# Patient Record
Sex: MALE | Race: WHITE | HISPANIC OR LATINO | Employment: UNEMPLOYED
[De-identification: names, ages, dates, MRNs, and addresses within clinical notes are randomized per-mention and may not be internally consistent; named-entity substitution may affect disease eponyms.]

---
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
## 2024-06-10 ENCOUNTER — OFFICE VISIT (OUTPATIENT)
Dept: INTERNAL MEDICINE CLINIC | Facility: CLINIC | Age: 19
End: 2024-06-10

## 2024-06-10 VITALS
HEART RATE: 56 BPM | DIASTOLIC BLOOD PRESSURE: 74 MMHG | OXYGEN SATURATION: 98 % | SYSTOLIC BLOOD PRESSURE: 120 MMHG | BODY MASS INDEX: 24.35 KG/M2 | HEIGHT: 67.5 IN | WEIGHT: 157 LBS

## 2024-06-10 DIAGNOSIS — Z00.00 ANNUAL PHYSICAL EXAM: Primary | ICD-10-CM

## 2024-06-10 DIAGNOSIS — E55.9 VITAMIN D DEFICIENCY: ICD-10-CM

## 2024-06-10 DIAGNOSIS — Z00.00 PHYSICAL EXAM: ICD-10-CM

## 2024-06-10 PROCEDURE — 99395 PREV VISIT EST AGE 18-39: CPT | Performed by: NURSE PRACTITIONER

## 2024-06-10 NOTE — PROGRESS NOTES
HPI:    Patient ID: Serafin Dale is a 18 year old male.  Allergic to Dogs    HPI Physical Exam  18 year old male who I met before. He denies any depression.  He does work out, play music.  He has not found a job but is working on it.  He smokes [place] daily.        Exercise- Weights    Vitamin D deficiency in the past    Immunization History   Administered Date(s) Administered    DTAP INFANRIX 08/13/2004, 10/15/2004, 12/16/2004, 09/23/2005, 07/02/2008    HEP A,Ped/Adol,(2 Dose) 07/18/2008, 06/03/2009    HPV (Gardasil) 09/08/2015    Hib, Unspecified Formulation 08/13/2004, 10/15/2004, 06/17/2005    Hpv Virus Vaccine 9 Dionne Im 05/02/2018    IPV 08/13/2004, 10/15/2004, 12/16/2004, 07/02/2008    MMR 06/17/2005, 09/23/2005, 07/02/2008    Meningococcal-Menveo 2month-55yr 09/08/2015, 08/19/2021    Pneumococcal (Prevnar 7) 02/18/2005, 03/18/2005, 04/22/2005, 05/13/2005    TDAP 09/08/2015    Varicella 06/17/2005, 07/02/2008   Pended Date(s) Pended    FLULAVAL 6 months & older 0.5 ml Prefilled syringe ([id number]) 11/10/2022       History reviewed. No pertinent past medical history.   History reviewed. No pertinent surgical history.   Social History     Socioeconomic History    Marital status: Single   Tobacco Use    Smoking status: Never    Smokeless tobacco: Never   Vaping Use    Vaping status: Never Used   Substance and Sexual Activity    Alcohol use: Not Currently    Drug use: Not Currently          Review of Systems   Constitutional:  Negative for chills, fatigue and fever.   HENT:  Negative for ear pain, hearing loss, sinus pain, sore throat, trouble swallowing and voice change.    Eyes:  Negative for pain and visual disturbance.   Respiratory:  Negative for cough, chest tightness and shortness of breath.    Cardiovascular:  Negative for chest pain, palpitations and leg swelling.   Gastrointestinal:  Negative for abdominal pain, constipation, diarrhea, nausea and vomiting.   Endocrine: Negative for cold intolerance and  heat intolerance.   Genitourinary:  Negative for dysuria and hematuria.   Musculoskeletal:  Positive for myalgias. Negative for back pain and joint swelling.   Skin:  Negative for rash.   Allergic/Immunologic: Negative for environmental allergies.   Neurological:  Negative for weakness, numbness and headaches.   Hematological:  Does not bruise/bleed easily.   Psychiatric/Behavioral:  Negative for dysphoric mood and sleep disturbance. The patient is nervous/anxious.               Current Outpatient Medications   Medication Sig Dispense Refill    cetirizine 10 MG Oral Tab Take 1 tablet (10 mg total) by mouth daily.       Allergies:  Allergies   Allergen Reactions    Penicillin G Benzathine HIVES      PHYSICAL EXAM:   Physical Exam  Constitutional:       Appearance: Normal appearance. He is well-developed.   HENT:      Head: Normocephalic.      Right Ear: Tympanic membrane normal.      Left Ear: Tympanic membrane normal.      Nose: Nose normal.      Mouth/Throat:      Mouth: Mucous membranes are moist.      Pharynx: No oropharyngeal exudate or posterior oropharyngeal erythema.   Eyes:      General:         Right eye: No discharge.         Left eye: No discharge.      Pupils: Pupils are equal, round, and reactive to light.   Cardiovascular:      Rate and Rhythm: Normal rate and regular rhythm.      Heart sounds: Normal heart sounds. No murmur heard.     No friction rub. No gallop.   Pulmonary:      Effort: Pulmonary effort is normal. No respiratory distress.      Breath sounds: Normal breath sounds. No wheezing, rhonchi or rales.   Abdominal:      General: Bowel sounds are normal. There is no distension.      Palpations: Abdomen is soft. There is no mass.      Tenderness: There is no abdominal tenderness. There is no right CVA tenderness, left CVA tenderness or guarding.   Musculoskeletal:         General: No tenderness.      Cervical back: Normal range of motion and neck supple. No tenderness.      Right lower leg: No  edema.      Left lower leg: No edema.   Lymphadenopathy:      Cervical: No cervical adenopathy.   Skin:     General: Skin is warm and dry.      Findings: No rash.   Neurological:      Mental Status: He is alert and oriented to person, place, and time.      Coordination: Coordination normal.      Gait: Gait normal.   Psychiatric:         Mood and Affect: Mood normal.         Behavior: Behavior normal.         Thought Content: Thought content normal.         Judgment: Judgment normal.       /74   Pulse 56   Ht 5' 7.5\" (1.715 m)   Wt 157 lb (71.2 kg)   SpO2 98%   BMI 24.23 kg/m²   Wt Readings from Last 2 Encounters:   06/10/24 157 lb (71.2 kg) (52%, Z= 0.05)*   11/10/22 156 lb (70.8 kg) (59%, Z= 0.24)*     * Growth percentiles are based on [facility] (Boys, 2-20 Years) data.     Body mass index is 24.23 kg/m².(2)  Lab Results   Component Value Date    WBC 7.3 07/28/2022    RBC 5.54 07/28/2022    HGB 15.5 07/28/2022    HCT 47.4 07/28/2022    MCV 85.6 07/28/2022    MCH 28.0 07/28/2022    MCHC 32.7 07/28/2022    RDW 12.5 07/28/2022    .0 07/28/2022      Lab Results   Component Value Date     (H) 07/28/2022    BUN 15 07/28/2022    BUNCREA 16.1 07/28/2022    CREATSERUM 0.93 07/28/2022    ANIONGAP 5 07/28/2022    GFRNAA 119 07/28/2022    GFRAA 138 07/28/2022    CA 9.5 07/28/2022    OSMOCALC 287 07/28/2022    ALKPHO 83 07/28/2022    AST 21 07/28/2022    ALT 20 07/28/2022    BILT 1.1 07/28/2022    TP 7.8 07/28/2022    ALB 4.3 07/28/2022    GLOBULIN 3.5 07/28/2022     07/28/2022    K 4.1 07/28/2022     07/28/2022    CO2 29.0 07/28/2022      No results found for: \"EAG\", \"A1C\"   No results found for: \"CHOLEST\", \"TRIG\", \"HDL\", \"LDL\", \"VLDL\", \"TCHDLRATIO\", \"NONHDLC\", \"CHOLHDLRATIO\", \"CALCNONHDL\"   No results found for: \"T4F\", \"TSH\", \"TSHT4\"             ASSESSMENT/PLAN:     Problem List Items Addressed This Visit       Vitamin D deficiency     Vitamin D deficiency in the past    Plan   Check Vitamin D  level         Physical exam     Normal exam.  Labs as ordered.  Skin check normal.  Hernial orifices intact.  No cervical, inguinal, axillary lymphadenopathy.  Discussed self testicular exam  Immunizations- Up to date             I            Other Visit Diagnoses       Annual physical exam    -  Primary    Relevant Orders    CBC W Differential W Platelet [E]    Comp Metabolic Panel (14)    Vitamin D               Orders Placed This Encounter   Procedures    CBC W Differential W Platelet [E]    Comp Metabolic Panel (14)    Vitamin D       Meds This Visit:  Requested Prescriptions      No prescriptions requested or ordered in this encounter       Imaging & Referrals:  None         JAMIE Dove

## 2024-06-11 NOTE — ASSESSMENT & PLAN NOTE
Normal exam.  Labs as ordered.  Skin check normal.  Hernial orifices intact.  No cervical, inguinal, axillary lymphadenopathy.  Discussed self testicular exam  Immunizations- Up to date             I

## 2024-11-29 ENCOUNTER — HOSPITAL ENCOUNTER (EMERGENCY)
Facility: HOSPITAL | Age: 19
Discharge: HOME | End: 2024-11-29
Attending: STUDENT IN AN ORGANIZED HEALTH CARE EDUCATION/TRAINING PROGRAM
Payer: MEDICAID

## 2024-11-29 VITALS
TEMPERATURE: 99 F | SYSTOLIC BLOOD PRESSURE: 131 MMHG | RESPIRATION RATE: 17 BRPM | WEIGHT: 165 LBS | HEART RATE: 88 BPM | OXYGEN SATURATION: 97 % | DIASTOLIC BLOOD PRESSURE: 78 MMHG | BODY MASS INDEX: 26.52 KG/M2 | HEIGHT: 66 IN

## 2024-11-29 DIAGNOSIS — J10.1 INFLUENZA A: Primary | ICD-10-CM

## 2024-11-29 LAB
FLUAV + FLUBV RNA SPEC NAA+PROBE: NEGATIVE
FLUAV + FLUBV RNA SPEC NAA+PROBE: POSITIVE
RSV RNA SPEC NAA+PROBE: NEGATIVE
SARS-COV-2 RNA RESP QL NAA+PROBE: NOT DETECTED

## 2024-11-29 PROCEDURE — 0241U SARS-COV-2/FLU A AND B/RSV BY PCR (GENEXPERT): CPT

## 2024-11-29 PROCEDURE — 99283 EMERGENCY DEPT VISIT LOW MDM: CPT

## 2024-11-29 PROCEDURE — 0241U SARS-COV-2/FLU A AND B/RSV BY PCR (GENEXPERT): CPT | Performed by: STUDENT IN AN ORGANIZED HEALTH CARE EDUCATION/TRAINING PROGRAM

## 2024-11-29 NOTE — ED INITIAL ASSESSMENT (HPI)
Patient presents with a cough for the past two weeks. Patient states for the past couple days he's had a headache, weakness and diarrhea as well.

## 2024-11-29 NOTE — DISCHARGE INSTRUCTIONS
Thank you for seeking care at [facility] Emergency Department.  You have been seen and evaluated for influenza   We discussed the results of your workup   Please read the instructions provided   If given prescriptions, take as instructed    Today you were seen for symptoms of the flu which is a contagious viral infection. Antibiotics will not help these infections. Please treat fevers with Tylenol or Motrin and remember to keep yourself hydrated as this is the most important thing to help you feel better.      Remember, your care process does not end after your visit today. Please follow-up with your doctor within 1-2 days for a follow-up check to ensure you are  improving, to see if you need any further evaluation/testing, or to evaluate for any alternate diagnoses.     Please return to the emergency department if you develop any new or worsening symptoms such as dehydration, persistent fevers, difficulty with breathing,  or if you develop any other new or concerning symptoms as these could be signs of more serious medical illness.    We hope you feel better.

## 2024-11-30 NOTE — ED PROVIDER NOTES
[place] Emergency Department Note  Patient: Serafin Dale Age: 19 year old Sex: male      MRN: [id number]  : 2005    Patient Seen in: [facility] Emergency Department    History     Chief Complaint   Patient presents with    Headache    Fatigue    Nausea/Vomiting/Diarrhea     Stated Complaint: headache/congestion    History obtained from: Patient    19-year-old male presenting today for evaluation of headaches, body aches, cough, diarrhea over the past 3 days.  He states the cough started approximately 2 weeks ago but worsened over the past several days.  States that several family members are sick with similar symptoms.  Is otherwise tolerating p.o. intake without difficulty.    Review of Systems:  Review of Systems  Positive for stated complaint: headache/congestion. Constitutional and vital signs reviewed. All other systems reviewed and negative except as noted above.    Patient History:  History reviewed. No pertinent past medical history.    History reviewed. No pertinent surgical history.     No family history on file.    Specific Social Determinants of Health:   Social History     Socioeconomic History    Marital status: Single   Tobacco Use    Smoking status: Never    Smokeless tobacco: Never   Vaping Use    Vaping status: Never Used   Substance and Sexual Activity    Alcohol use: Not Currently    Drug use: Not Currently           PSFH elements reviewed from today and agreed except as otherwise stated in HPI.    Physical Exam     ED Triage Vitals [24 1115]   /80   Pulse 100   Resp 16   Temp 98.9 °F (37.2 °C)   Temp src Oral   SpO2 95 %   O2 Device None (Room air)       Current:/78   Pulse 88   Temp 98.5 °F (36.9 °C) (Oral)   Resp 17   Ht 167.6 cm (5' 6\")   Wt 74.8 kg   SpO2 97%   BMI 26.63 kg/m²         Physical Exam  Constitutional:       Appearance: He is well-developed.   HENT:      Head: Normocephalic and atraumatic.      Right Ear: External ear normal.      Left  Ear: External ear normal.      Nose: Nose normal.   Eyes:      Conjunctiva/sclera: Conjunctivae normal.      Pupils: Pupils are equal, round, and reactive to light.   Cardiovascular:      Rate and Rhythm: Normal rate and regular rhythm.      Heart sounds: Normal heart sounds.   Pulmonary:      Effort: Pulmonary effort is normal.      Breath sounds: Normal breath sounds.   Abdominal:      General: Bowel sounds are normal.      Palpations: Abdomen is soft.      Tenderness: There is no abdominal tenderness.   Musculoskeletal:         General: Normal range of motion.      Cervical back: Normal range of motion and neck supple.   Skin:     General: Skin is warm and dry.      Findings: No rash.   Neurological:      General: No focal deficit present.      Mental Status: He is alert and oriented to person, place, and time.      Deep Tendon Reflexes: Reflexes are normal and symmetric.   Psychiatric:         Mood and Affect: Mood normal.         Behavior: Behavior normal.         ED Course   Labs:   Labs Reviewed   SARS-COV-2/FLU A AND B/RSV BY PCR (GENEXPERT) - Abnormal; Notable for the following components:       Result Value    Influenza A by PCR Positive (*)     All other components within normal limits    Narrative:     This test is intended for the qualitative detection and differentiation of SARS-CoV-2, influenza A, influenza B, and respiratory syncytial virus (RSV) viral RNA in nasopharyngeal or nares swabs from individuals suspected of respiratory viral infection consistent with COVID-19 by their healthcare provider. Signs and symptoms of respiratory viral infection due to SARS-CoV-2, influenza, and RSV can be similar.    Test performed using the Xpert Xpress SARS-CoV-2/FLU/RSV (real time RT-PCR)  assay on the GeneXpert instrument, [employer], [place] [address].   This test is being used under the Food and Drug Administration's Emergency Use Authorization.    The authorized Fact Sheet for Healthcare Providers for this  assay is available upon request from the laboratory.     Radiology findings:  I personally reviewed the images.   No results found.        MDM   Previously healthy 19-year-old male presenting for 3 days of viral symptoms.  Several sick contacts with similar symptoms.  His exam is reassuring.  His lungs are clear to auscultation bilaterally.  Abdominal exam shows no tenderness and no distention.  TMs are clear bilaterally.  Pharynx without erythema, exudates or edema.  Appears well-perfused and well-hydrated.  No skin rash.    Differential diagnoses considered includes, but is not limited to: Viral syndrome, influenza, considered pneumonia, otitis, or pharyngitis however inconsistent with physical examination.    Will obtain the following tests: COVID/flu/RSV panel  Please see ED course for my independent review of these tests/imaging results.    Initial Medications/Therapeutics administered: None    Chronic conditions affecting care: None     ED course: Influenza A positive on viral swab.  Discussed continued supportive care with Tylenol, ibuprofen as needed for pain, headaches, or fevers.  Discussed continued oral hydration and close PCP follow-up.  Return precautions were discussed and all questions answered.  Patient expressed understanding and agreement with plan.    Disposition and Plan     Clinical Impression:  1. Influenza A        Disposition:  Discharge    Follow-up:  Nonstaff, Physician    Schedule an appointment as soon as possible for a visit in 2 day(s)  As needed, If symptoms worsen      Medications Prescribed:  Discharge Medication List as of 11/29/2024  1:09 PM            This note may have been created using voice dictation technology and may include inadvertent errors.      Helena Prasad MD  Emergency Medicine

## 2025-06-26 ENCOUNTER — OFFICE VISIT (OUTPATIENT)
Dept: INTERNAL MEDICINE CLINIC | Facility: CLINIC | Age: 20
End: 2025-06-26
Payer: MEDICAID

## 2025-06-26 VITALS
RESPIRATION RATE: 16 BRPM | HEIGHT: 66 IN | BODY MASS INDEX: 28.34 KG/M2 | OXYGEN SATURATION: 97 % | HEART RATE: 62 BPM | SYSTOLIC BLOOD PRESSURE: 125 MMHG | WEIGHT: 176.38 LBS | DIASTOLIC BLOOD PRESSURE: 77 MMHG

## 2025-06-26 DIAGNOSIS — Z00.00 ANNUAL PHYSICAL EXAM: Primary | ICD-10-CM

## 2025-06-26 DIAGNOSIS — E55.9 VITAMIN D DEFICIENCY: ICD-10-CM

## 2025-06-26 DIAGNOSIS — E53.9 VITAMIN B DEFICIENCY: ICD-10-CM

## 2025-06-26 DIAGNOSIS — Z00.00 PHYSICAL EXAM: ICD-10-CM

## 2025-06-26 PROCEDURE — 99395 PREV VISIT EST AGE 18-39: CPT | Performed by: NURSE PRACTITIONER

## 2025-06-26 NOTE — ASSESSMENT & PLAN NOTE
Normal exam.  Labs as ordered.  Skin check normal.  Hernial orifices intact.  No cervical, inguinal, axillary lymphadenopathy.  Discussed self testicular exam  Immunizations- Up to date     Hand Grasp Weakness  Possible carpal tunnerl syndrome from pusing carts  Hand grasp equal  Trial wrist braces at night

## 2025-06-26 NOTE — PROGRESS NOTES
HPI:        The following individual(s) verbally consented to be recorded using ambient AI listening technology and understand that they can each withdraw their consent to this listening technology at any point by asking the clinician to turn off or pause the recording:    Patient name: Serafin Dale          Patient ID: Serafin Dale is a 20 year old male.    HPI Physical Exam  20 year old male who I have met before. He is healthy.  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  His only complaint is he feel he has a weaker .  History of Present Illness         Immunization History   Administered Date(s) Administered    DTAP 08/13/2004, 10/15/2004, 12/16/2004, 09/23/2005, 07/02/2008    HEP A,Ped/Adol,(2 Dose) 07/18/2008, 06/03/2009    HPV (Gardasil) 09/08/2015    Hib, Unspecified Formulation 08/13/2004, 10/15/2004, 06/17/2005    Hpv Virus Vaccine 9 Dionne Im 05/02/2018    IPV 08/13/2004, 10/15/2004, 12/16/2004, 07/02/2008    MMR 06/17/2005, 09/23/2005, 07/02/2008    Meningococcal-Menveo 2month-55yr 09/08/2015, 08/19/2021    Pneumococcal (Prevnar 7) 02/18/2005, 03/18/2005, 04/22/2005, 05/13/2005    TDAP 09/08/2015    Varicella 06/17/2005, 07/02/2008   Pended Date(s) Pended    FLULAVAL 6 months & older 0.5 ml Prefilled syringe ([id number]) 11/10/2022       No past medical history on file.   No past surgical history on file.   Social History     Socioeconomic History    Marital status: Single   Tobacco Use    Smoking status: Never    Smokeless tobacco: Never   Vaping Use    Vaping status: Never Used   Substance and Sexual Activity    Alcohol use: Not Currently    Drug use: Not Currently          Review of Systems   Constitutional:  Negative for chills, fatigue and fever.   HENT:  Negative for ear pain, hearing loss, sinus pain, sore throat, trouble swallowing and voice change.    Eyes:  Negative for pain and visual disturbance.   Respiratory:  Negative for cough, chest tightness  and shortness of breath.    Cardiovascular:  Negative for chest pain, palpitations and leg swelling.   Gastrointestinal:  Negative for abdominal pain, constipation, diarrhea, nausea and vomiting.   Endocrine: Negative for cold intolerance and heat intolerance.   Genitourinary:  Negative for dysuria and hematuria.   Musculoskeletal:  Negative for back pain, joint swelling and neck stiffness.   Skin:  Negative for rash.   Allergic/Immunologic: Negative for environmental allergies.   Neurological:  Negative for weakness, numbness and headaches.   Hematological:  Does not bruise/bleed easily.   Psychiatric/Behavioral:  Negative for dysphoric mood and sleep disturbance. The patient is nervous/anxious.               Current Outpatient Medications   Medication Sig Dispense Refill    cetirizine 10 MG Oral Tab Take 1 tablet (10 mg total) by mouth daily.       Allergies:  Allergies   Allergen Reactions    Penicillin G Benzathine HIVES      PHYSICAL EXAM:   Physical Exam  Constitutional:       Appearance: Normal appearance. He is well-developed.   HENT:      Head: Normocephalic.      Right Ear: Tympanic membrane normal.      Left Ear: Tympanic membrane normal.      Nose: Nose normal.      Mouth/Throat:      Mouth: Mucous membranes are moist.      Pharynx: No oropharyngeal exudate or posterior oropharyngeal erythema.   Eyes:      General:         Right eye: No discharge.         Left eye: No discharge.      Pupils: Pupils are equal, round, and reactive to light.   Cardiovascular:      Rate and Rhythm: Normal rate and regular rhythm.      Heart sounds: Normal heart sounds. No murmur heard.     No friction rub. No gallop.   Pulmonary:      Effort: Pulmonary effort is normal. No respiratory distress.      Breath sounds: Normal breath sounds. No wheezing, rhonchi or rales.   Abdominal:      General: Bowel sounds are normal. There is no distension.      Palpations: Abdomen is soft. There is no mass.      Tenderness: There is no  abdominal tenderness. There is no right CVA tenderness, left CVA tenderness or guarding.   Musculoskeletal:         General: No tenderness.      Cervical back: Normal range of motion and neck supple. No tenderness.      Right lower leg: No edema.      Left lower leg: No edema.   Lymphadenopathy:      Cervical: No cervical adenopathy.   Skin:     General: Skin is warm and dry.      Findings: No rash.   Neurological:      Mental Status: He is alert and oriented to person, place, and time.      Coordination: Coordination normal.      Gait: Gait normal.   Psychiatric:         Mood and Affect: Mood normal.         Behavior: Behavior normal.         Thought Content: Thought content normal.         Judgment: Judgment normal.       /77 (BP Location: Left arm, Patient Position: Sitting, Cuff Size: adult)   Pulse 62   Resp 16   Ht 5' 6\" (1.676 m)   Wt 176 lb 6.4 oz (80 kg)   SpO2 97%   BMI 28.47 kg/m²   Wt Readings from Last 2 Encounters:   06/26/25 176 lb 6.4 oz (80 kg)   11/29/24 165 lb (74.8 kg)     Body mass index is 28.47 kg/m².(2)  Lab Results   Component Value Date    WBC 7.3 07/28/2022    RBC 5.54 07/28/2022    HGB 15.5 07/28/2022    HCT 47.4 07/28/2022    MCV 85.6 07/28/2022    MCH 28.0 07/28/2022    MCHC 32.7 07/28/2022    RDW 12.5 07/28/2022    .0 07/28/2022      Lab Results   Component Value Date     (H) 07/28/2022    BUN 15 07/28/2022    BUNCREA 16.1 07/28/2022    CREATSERUM 0.93 07/28/2022    ANIONGAP 5 07/28/2022    GFRNAA 119 07/28/2022    GFRAA 138 07/28/2022    CA 9.5 07/28/2022    OSMOCALC 287 07/28/2022    ALKPHO 83 07/28/2022    AST 21 07/28/2022    ALT 20 07/28/2022    BILT 1.1 07/28/2022    TP 7.8 07/28/2022    ALB 4.3 07/28/2022    GLOBULIN 3.5 07/28/2022     07/28/2022    K 4.1 07/28/2022     07/28/2022    CO2 29.0 07/28/2022      No results found for: \"EAG\", \"A1C\"   No results found for: \"CHOLEST\", \"TRIG\", \"HDL\", \"LDL\", \"VLDL\", \"TCHDLRATIO\", \"NONHDLC\",  \"CHOLHDLRATIO\", \"CALCNONHDL\"   No results found for: \"T4F\", \"TSH\", \"TSHT4\"             ASSESSMENT/PLAN:     Assessment & Plan  Annual physical exam    Orders:    Comp Metabolic Panel (14); Future    Lipid Panel; Future    TSH W Reflex To Free T4; Future    Vitamin D, 25-Hydroxy; Future    Vitamin B12; Future    CBC, NO DIFFERENTIAL/PLATELET; Future    Vitamin D deficiency    Orders:    Vitamin D, 25-Hydroxy; Future    Vitamin B deficiency    Orders:    Vitamin B12; Future    Physical exam  Normal exam.  Labs as ordered.  Skin check normal.  Hernial orifices intact.  No cervical, inguinal, axillary lymphadenopathy.  Discussed self testicular exam  Immunizations- Up to date     Hand Grasp Weakness  Possible carpal tunnerl syndrome from pusing carts  Hand grasp equal  Trial wrist braces at night         Orders Placed This Encounter   Procedures    Comp Metabolic Panel (14)    Lipid Panel    TSH W Reflex To Free T4    Vitamin D, 25-Hydroxy    Vitamin B12    CBC, NO DIFFERENTIAL/PLATELET       Assessment & Plan         Meds This Visit:  Requested Prescriptions      No prescriptions requested or ordered in this encounter       Imaging & Referrals:  None         JAMIE Dove